# Patient Record
Sex: FEMALE | Race: WHITE | NOT HISPANIC OR LATINO | Employment: FULL TIME | URBAN - METROPOLITAN AREA
[De-identification: names, ages, dates, MRNs, and addresses within clinical notes are randomized per-mention and may not be internally consistent; named-entity substitution may affect disease eponyms.]

---

## 2018-01-17 NOTE — RESULT NOTES
Verified Results  * MRI KNEE LEFT  WO CONTRAST [id number] 05:59AM Suze Luu Order Number: [id number]   Performing Comments: 42-year-old female status post left lower extremity injury involving the hip knee and ankle with worsening left medial knee pain  Please evaluate for medial femoral condyle and medial meniscal injury  - Patient Instructions: To    schedule this appointment, please contact Central Scheduling at [phone]  Test Name Result Flag Reference   MRI KNEE LEFT  WO CONTRAST (Report)     This is a summary report  The complete report is available in the patient's medical record  If you cannot access the medical record, please contact the sending organization for a detailed fax or copy  MRI LEFT KNEE     INDICATION: Medial left knee pain and swelling status post fall on 4/22/2016  COMPARISON: Left knee plain films from 4/27/2016     TECHNIQUE:  MR sequences were obtained of the left knee including: Localizer, axial T2 fat sat, coronal T1/T2 fat sat, sagittal PD/T2 fat sat  Images were acquired on a 1 5 Crystal unit  Gadolinium was not used  FINDINGS:     SUBCUTANEOUS TISSUES: There is prepatellar subcutaneous edema without bursitis formation  JOINT EFFUSION: None  BAKER'S CYST: None  MENISCI: Intact  CRUCIATE LIGAMENTS: Intact  EXTENSOR APPARATUS: There is mild tendinosis of the patellar tendon, without tear  (Series 6 image 12 ) Quadriceps tendon is normal      COLLATERAL LIGAMENTS: There is a subacute, partial tear (grade 2 sprain) of the medial collateral ligament (series 5 images 11 through 13, and series 3 images 9 through 17 ) The lateral collateral ligament complex is normal      ARTICULAR SURFACES: There is mild tricompartment osteoarthritis  BONE MARROW: Normal signal without fracture  MUSCULATURE: Intact         IMPRESSION:     There is a subacute, partial tear (grade 2 sprain) of the medial collateral ligament (series 5 images 11 through 13, and series 3 images 9 through 17 )     Mild tricompartmental osteoarthritis  There is mild tendinosis of the patellar tendon, without tear  (Series 6 image 12 )     There is no evidence of meniscal tear         Workstation performed: [id number]     Signed by:   Sonia Hanna MD   6/29/16

## 2024-03-21 ENCOUNTER — OFFICE VISIT (OUTPATIENT)
Dept: SURGERY | Facility: CLINIC | Age: 69
End: 2024-03-21
Payer: COMMERCIAL

## 2024-03-21 VITALS
DIASTOLIC BLOOD PRESSURE: 80 MMHG | HEIGHT: 66 IN | HEART RATE: 83 BPM | TEMPERATURE: 99.2 F | BODY MASS INDEX: 47.09 KG/M2 | WEIGHT: 293 LBS | SYSTOLIC BLOOD PRESSURE: 140 MMHG | OXYGEN SATURATION: 93 %

## 2024-03-21 DIAGNOSIS — R22.32 AXILLARY MASS, LEFT: Primary | ICD-10-CM

## 2024-03-21 PROBLEM — J45.909 BRONCHIAL ASTHMA: Status: ACTIVE | Noted: 2024-03-21

## 2024-03-21 PROBLEM — I10 ESSENTIAL HYPERTENSION: Status: ACTIVE | Noted: 2024-03-21

## 2024-03-21 PROBLEM — K80.20 ASYMPTOMATIC CHOLELITHIASIS: Status: ACTIVE | Noted: 2024-03-21

## 2024-03-21 PROBLEM — I35.0 AORTIC STENOSIS: Status: ACTIVE | Noted: 2024-03-21

## 2024-03-21 PROCEDURE — 99203 OFFICE O/P NEW LOW 30 MIN: CPT | Performed by: SURGERY

## 2024-03-21 RX ORDER — ALBUTEROL SULFATE 90 UG/1
AEROSOL, METERED RESPIRATORY (INHALATION)
COMMUNITY
Start: 2024-01-18

## 2024-03-21 RX ORDER — FUROSEMIDE 20 MG/1
20 TABLET ORAL DAILY PRN
COMMUNITY
Start: 2024-01-04 | End: 2025-01-03

## 2024-03-21 RX ORDER — ALBUTEROL SULFATE 2.5 MG/3ML
SOLUTION RESPIRATORY (INHALATION)
COMMUNITY

## 2024-03-21 NOTE — PROGRESS NOTES
"Assessment/Plan:     Diagnoses and all orders for this visit:    Axillary mass, left    Other orders  -     furosemide (LASIX) 20 mg tablet; Take 20 mg by mouth daily as needed  -     albuterol (2.5 mg/3 mL) 0.083 % nebulizer solution; TAKE 3 ML (2.5 MG TOTAL) BY NEBULIZATION 3 TIME DAY  -     albuterol (PROVENTIL HFA,VENTOLIN HFA) 90 mcg/act inhaler     Patient is being scheduled for excision of left axillary mass under general anesthesia at [facility] in June.    Procedure discussed with patient and informed consent obtained    Risks include bleeding, infection, numbness in the axilla    Preadmission testing: CBC, BMP, EKG    Subjective:      Patient ID: Josephine Thomson is a 68 y.o. female.  Retired     HPI  Patient is seen in the office with several years history of a painless mass in the left axilla.  This has been investigated by her previous surgeon.  This was presumed to be a benign mass.  Patient was preparing to have this excised before the COVID pandemic.  The surgery was hence delayed.  This is a painless mass with history of drainage from the skin.    The following portions of the patient's history were reviewed and updated as appropriate: allergies, current medications, past family history, past medical history, past social history, past surgical history, and problem list.    Review of Systems    History of bilateral breast biopsies of a benign lumps  History of left oophorectomy in the remote past    Mild to moderate aortic stenosis for which patient is asymptomatic.  She denies any chest pain or palpitations.  She has been under the care of a cardiologist.    Essential hypertension    Bronchial asthma    Left ankle replacement 2019    Objective:    /80 (BP Location: Right arm, Patient Position: Sitting, Cuff Size: Standard)   Pulse 83   Temp 99.2 °F (37.3 °C) (Tympanic)   Ht 5' 5.5\" (1.664 m)   Wt 133 kg (293 lb)   LMP  (LMP Unknown)   SpO2 93%   BMI 48.02 kg/m² "      Physical Exam    No pallor or icterus  No cervical lymphadenopathy  In the left axilla on the medial wall is a palpable mass measuring 5 cm in size.  This is a subcutaneous mass which feels like a lipoma.  On the summit of the mass is a reddish inflamed area which may be a punctum related to a small cyst although the cyst is not discretely palpable.    Heart sounds are normal with a grade 2/6 ejection systolic murmur  Chest clear to auscultation with no wheezing    Abdominal exam reveals no masses    Extremity exam reveals a scar on the left ankle where patient had a ankle replacement

## 2024-05-23 ENCOUNTER — APPOINTMENT (OUTPATIENT)
Dept: LAB | Facility: CLINIC | Age: 69
End: 2024-05-23
Payer: COMMERCIAL

## 2024-05-23 DIAGNOSIS — R22.32 AXILLARY MASS, LEFT: ICD-10-CM

## 2024-05-23 LAB
ANION GAP SERPL CALCULATED.3IONS-SCNC: 9 MMOL/L (ref 4–13)
BUN SERPL-MCNC: 20 MG/DL (ref 5–25)
CALCIUM SERPL-MCNC: 9.7 MG/DL (ref 8.4–10.2)
CHLORIDE SERPL-SCNC: 98 MMOL/L (ref 96–108)
CO2 SERPL-SCNC: 29 MMOL/L (ref 21–32)
CREAT SERPL-MCNC: 0.73 MG/DL (ref 0.6–1.3)
ERYTHROCYTE [DISTWIDTH] IN BLOOD BY AUTOMATED COUNT: 13.4 % (ref 11.6–15.1)
GFR SERPL CREATININE-BSD FRML MDRD: 85 ML/MIN/1.73SQ M
GLUCOSE P FAST SERPL-MCNC: 109 MG/DL (ref 65–99)
HCT VFR BLD AUTO: 41.9 % (ref 34.8–46.1)
HGB BLD-MCNC: 14.1 G/DL (ref 11.5–15.4)
MCH RBC QN AUTO: 29.6 PG (ref 26.8–34.3)
MCHC RBC AUTO-ENTMCNC: 33.7 G/DL (ref 31.4–37.4)
MCV RBC AUTO: 88 FL (ref 82–98)
PLATELET # BLD AUTO: 207 THOUSANDS/UL (ref 149–390)
PMV BLD AUTO: 11.6 FL (ref 8.9–12.7)
POTASSIUM SERPL-SCNC: 4.2 MMOL/L (ref 3.5–5.3)
RBC # BLD AUTO: 4.76 MILLION/UL (ref 3.81–5.12)
SODIUM SERPL-SCNC: 136 MMOL/L (ref 135–147)
WBC # BLD AUTO: 6.63 THOUSAND/UL (ref 4.31–10.16)

## 2024-05-23 PROCEDURE — 80048 BASIC METABOLIC PNL TOTAL CA: CPT

## 2024-05-23 PROCEDURE — 85027 COMPLETE CBC AUTOMATED: CPT

## 2024-05-23 PROCEDURE — 36415 COLL VENOUS BLD VENIPUNCTURE: CPT

## 2024-05-30 RX ORDER — UREA 10 %
1 LOTION (ML) TOPICAL
COMMUNITY

## 2024-05-30 RX ORDER — VALSARTAN AND HYDROCHLOROTHIAZIDE 160; 25 MG/1; MG/1
1 TABLET ORAL
COMMUNITY
Start: 2024-05-06

## 2024-05-30 NOTE — PRE-PROCEDURE INSTRUCTIONS
Pre-Surgery Instructions:   Medication Instructions    albuterol (2.5 mg/3 mL) 0.083 % nebulizer solution Uses PRN- OK to take day of surgery    albuterol (PROVENTIL HFA,VENTOLIN HFA) 90 mcg/act inhaler Uses PRN- OK to take day of surgery    furosemide (LASIX) 20 mg tablet Uses PRN- DO NOT take day of surgery    melatonin 1 mg Stop taking 7 days prior to surgery.    NON FORMULARY Stop taking 7 days prior to surgery.    NON FORMULARY Stop taking 7 days prior to surgery.    valsartan-hydrochlorothiazide (DIOVAN-HCT) 160-25 MG per tablet Take night before surgery   Medication instructions for day surgery reviewed. Please use only a sip of water to take your instructed medications. Avoid all over the counter vitamins, supplements and NSAIDS for one week prior to surgery per anesthesia guidelines. Tylenol is ok to take as needed.     You will receive a call one business day prior to surgery with an arrival time and hospital directions. If your surgery is scheduled on a Monday, the hospital will be calling you on the Friday prior to your surgery. If you have not heard from anyone by 8pm, please call the hospital supervisor through the hospital  at [phone]. ([place] [phone] or [place] [phone]).    Do not eat or drink anything after midnight the night before your surgery, including candy, mints, lifesavers, or chewing gum. Do not drink alcohol 24hrs before your surgery. Try not to smoke at least 24hrs before your surgery.       Follow the pre surgery showering instructions as listed in the “My Surgical Experience Booklet” or otherwise provided by your surgeon's office. Do not use a blade to shave the surgical area 1 week before surgery. It is okay to use a clean electric clippers up to 24 hours before surgery. Do not apply any lotions, creams, including makeup, cologne, deodorant, or perfumes after showering on the day of your surgery. Do not use dry shampoo, hair spray, hair gel, or any type of  hair products.     No contact lenses, eye make-up, or artificial eyelashes. Remove nail polish, including gel polish, and any artificial, gel, or acrylic nails if possible. Remove all jewelry including rings and body piercing jewelry.     Wear causal clothing that is easy to take on and off. Consider your type of surgery.    Keep any valuables, jewelry, piercings at home. Please bring any specially ordered equipment (sling, braces) if indicated.    Arrange for a responsible person to drive you to and from the hospital on the day of your surgery. Please confirm the visitor policy for the day of your procedure when you receive your phone call with an arrival time.     Call the surgeon's office with any new illnesses, exposures, or additional questions prior to surgery.    Please reference your “My Surgical Experience Booklet” for additional information to prepare for your upcoming surgery.

## 2024-06-03 ENCOUNTER — ANESTHESIA EVENT (OUTPATIENT)
Dept: PERIOP | Facility: HOSPITAL | Age: 69
End: 2024-06-03
Payer: COMMERCIAL

## 2024-06-07 ENCOUNTER — HOSPITAL ENCOUNTER (OUTPATIENT)
Facility: HOSPITAL | Age: 69
Setting detail: OUTPATIENT SURGERY
Discharge: HOME | End: 2024-06-07
Attending: SURGERY | Admitting: SURGERY
Payer: COMMERCIAL

## 2024-06-07 ENCOUNTER — ANESTHESIA (OUTPATIENT)
Dept: PERIOP | Facility: HOSPITAL | Age: 69
End: 2024-06-07
Payer: COMMERCIAL

## 2024-06-07 VITALS
TEMPERATURE: 97.9 F | HEART RATE: 56 BPM | HEIGHT: 65 IN | OXYGEN SATURATION: 94 % | SYSTOLIC BLOOD PRESSURE: 150 MMHG | WEIGHT: 285 LBS | BODY MASS INDEX: 47.48 KG/M2 | DIASTOLIC BLOOD PRESSURE: 65 MMHG | RESPIRATION RATE: 18 BRPM

## 2024-06-07 DIAGNOSIS — R22.32 AXILLARY MASS, LEFT: Primary | ICD-10-CM

## 2024-06-07 PROCEDURE — 11406 EXC TR-EXT B9+MARG >4.0 CM: CPT | Performed by: SURGERY

## 2024-06-07 PROCEDURE — 88304 TISSUE EXAM BY PATHOLOGIST: CPT | Performed by: PATHOLOGY

## 2024-06-07 PROCEDURE — NC001 PR NO CHARGE: Performed by: SURGERY

## 2024-06-07 PROCEDURE — 11406 EXC TR-EXT B9+MARG >4.0 CM: CPT | Performed by: PHYSICIAN ASSISTANT

## 2024-06-07 RX ORDER — PROPOFOL 10 MG/ML
INJECTION, EMULSION INTRAVENOUS AS NEEDED
Status: DISCONTINUED | OUTPATIENT
Start: 2024-06-07 | End: 2024-06-07

## 2024-06-07 RX ORDER — ONDANSETRON 2 MG/ML
INJECTION INTRAMUSCULAR; INTRAVENOUS AS NEEDED
Status: DISCONTINUED | OUTPATIENT
Start: 2024-06-07 | End: 2024-06-07

## 2024-06-07 RX ORDER — ACETAMINOPHEN 325 MG/1
975 TABLET ORAL ONCE
Status: COMPLETED | OUTPATIENT
Start: 2024-06-07 | End: 2024-06-07

## 2024-06-07 RX ORDER — DEXAMETHASONE SODIUM PHOSPHATE 10 MG/ML
INJECTION, SOLUTION INTRAMUSCULAR; INTRAVENOUS AS NEEDED
Status: DISCONTINUED | OUTPATIENT
Start: 2024-06-07 | End: 2024-06-07

## 2024-06-07 RX ORDER — FENTANYL CITRATE 50 UG/ML
INJECTION, SOLUTION INTRAMUSCULAR; INTRAVENOUS AS NEEDED
Status: DISCONTINUED | OUTPATIENT
Start: 2024-06-07 | End: 2024-06-07

## 2024-06-07 RX ORDER — CEFAZOLIN SODIUM 1 G/50ML
1000 SOLUTION INTRAVENOUS ONCE
Status: DISCONTINUED | OUTPATIENT
Start: 2024-06-07 | End: 2024-06-07 | Stop reason: HOSPADM

## 2024-06-07 RX ORDER — CEFAZOLIN SODIUM 2 G/50ML
2000 SOLUTION INTRAVENOUS ONCE
Status: COMPLETED | OUTPATIENT
Start: 2024-06-07 | End: 2024-06-07

## 2024-06-07 RX ORDER — SODIUM CHLORIDE, SODIUM LACTATE, POTASSIUM CHLORIDE, CALCIUM CHLORIDE 600; 310; 30; 20 MG/100ML; MG/100ML; MG/100ML; MG/100ML
INJECTION, SOLUTION INTRAVENOUS CONTINUOUS PRN
Status: DISCONTINUED | OUTPATIENT
Start: 2024-06-07 | End: 2024-06-07

## 2024-06-07 RX ORDER — METOCLOPRAMIDE HYDROCHLORIDE 5 MG/ML
10 INJECTION INTRAMUSCULAR; INTRAVENOUS ONCE AS NEEDED
Status: DISCONTINUED | OUTPATIENT
Start: 2024-06-07 | End: 2024-06-07 | Stop reason: HOSPADM

## 2024-06-07 RX ORDER — LIDOCAINE HYDROCHLORIDE 10 MG/ML
INJECTION, SOLUTION EPIDURAL; INFILTRATION; INTRACAUDAL; PERINEURAL AS NEEDED
Status: DISCONTINUED | OUTPATIENT
Start: 2024-06-07 | End: 2024-06-07

## 2024-06-07 RX ORDER — OXYCODONE HYDROCHLORIDE AND ACETAMINOPHEN 5; 325 MG/1; MG/1
1 TABLET ORAL EVERY 4 HOURS PRN
Qty: 10 TABLET | Refills: 0 | Status: SHIPPED | OUTPATIENT
Start: 2024-06-07 | End: 2024-06-17

## 2024-06-07 RX ORDER — LIDOCAINE HYDROCHLORIDE AND EPINEPHRINE 10; 10 MG/ML; UG/ML
INJECTION, SOLUTION INFILTRATION; PERINEURAL AS NEEDED
Status: DISCONTINUED | OUTPATIENT
Start: 2024-06-07 | End: 2024-06-07 | Stop reason: HOSPADM

## 2024-06-07 RX ORDER — FENTANYL CITRATE/PF 50 MCG/ML
50 SYRINGE (ML) INJECTION
Status: DISCONTINUED | OUTPATIENT
Start: 2024-06-07 | End: 2024-06-07 | Stop reason: HOSPADM

## 2024-06-07 RX ORDER — OXYCODONE HYDROCHLORIDE AND ACETAMINOPHEN 5; 325 MG/1; MG/1
1 TABLET ORAL EVERY 4 HOURS PRN
Status: DISCONTINUED | OUTPATIENT
Start: 2024-06-07 | End: 2024-06-07 | Stop reason: HOSPADM

## 2024-06-07 RX ORDER — BUPIVACAINE HYDROCHLORIDE AND EPINEPHRINE 2.5; 5 MG/ML; UG/ML
INJECTION, SOLUTION EPIDURAL; INFILTRATION; INTRACAUDAL; PERINEURAL AS NEEDED
Status: DISCONTINUED | OUTPATIENT
Start: 2024-06-07 | End: 2024-06-07 | Stop reason: HOSPADM

## 2024-06-07 RX ORDER — MAGNESIUM HYDROXIDE 1200 MG/15ML
LIQUID ORAL AS NEEDED
Status: DISCONTINUED | OUTPATIENT
Start: 2024-06-07 | End: 2024-06-07 | Stop reason: HOSPADM

## 2024-06-07 RX ORDER — MIDAZOLAM HYDROCHLORIDE 2 MG/2ML
INJECTION, SOLUTION INTRAMUSCULAR; INTRAVENOUS AS NEEDED
Status: DISCONTINUED | OUTPATIENT
Start: 2024-06-07 | End: 2024-06-07

## 2024-06-07 RX ORDER — HYDROMORPHONE HCL/PF 1 MG/ML
0.5 SYRINGE (ML) INJECTION
Status: DISCONTINUED | OUTPATIENT
Start: 2024-06-07 | End: 2024-06-07 | Stop reason: HOSPADM

## 2024-06-07 RX ORDER — EPHEDRINE SULFATE 50 MG/ML
INJECTION INTRAVENOUS AS NEEDED
Status: DISCONTINUED | OUTPATIENT
Start: 2024-06-07 | End: 2024-06-07

## 2024-06-07 RX ORDER — ONDANSETRON 2 MG/ML
4 INJECTION INTRAMUSCULAR; INTRAVENOUS ONCE AS NEEDED
Status: DISCONTINUED | OUTPATIENT
Start: 2024-06-07 | End: 2024-06-07 | Stop reason: HOSPADM

## 2024-06-07 RX ADMIN — SODIUM CHLORIDE, SODIUM LACTATE, POTASSIUM CHLORIDE, CALCIUM CHLORIDE: 600; 310; 30; 20 INJECTION, SOLUTION INTRAVENOUS at 08:15

## 2024-06-07 RX ADMIN — MIDAZOLAM HYDROCHLORIDE 2 MG: 2 INJECTION, SOLUTION INTRAMUSCULAR; INTRAVENOUS at 09:35

## 2024-06-07 RX ADMIN — EPHEDRINE SULFATE 10 MG: 50 INJECTION INTRAVENOUS at 10:02

## 2024-06-07 RX ADMIN — LIDOCAINE HYDROCHLORIDE 50 MG: 10 INJECTION, SOLUTION EPIDURAL; INFILTRATION; INTRACAUDAL; PERINEURAL at 09:40

## 2024-06-07 RX ADMIN — DEXAMETHASONE SODIUM PHOSPHATE 10 MG: 10 INJECTION, SOLUTION INTRAMUSCULAR; INTRAVENOUS at 09:40

## 2024-06-07 RX ADMIN — ACETAMINOPHEN 975 MG: 325 TABLET, FILM COATED ORAL at 08:23

## 2024-06-07 RX ADMIN — ONDANSETRON 4 MG: 2 INJECTION INTRAMUSCULAR; INTRAVENOUS at 09:40

## 2024-06-07 RX ADMIN — FENTANYL CITRATE 25 MCG: 50 INJECTION, SOLUTION INTRAMUSCULAR; INTRAVENOUS at 09:50

## 2024-06-07 RX ADMIN — PROPOFOL 200 MG: 10 INJECTION, EMULSION INTRAVENOUS at 09:40

## 2024-06-07 RX ADMIN — CEFAZOLIN SODIUM 3000 MG: 2 SOLUTION INTRAVENOUS at 09:35

## 2024-06-07 NOTE — ANESTHESIA PREPROCEDURE EVALUATION
Procedure:  EXCISION LEFT AXILLARY MASS (Left: Axilla)    Relevant Problems   ANESTHESIA (within normal limits)      CARDIO   (+) Aortic stenosis   (+) Essential hypertension      PULMONARY   (+) Bronchial asthma   (+) Sleep apnea      Surgery/Wound/Pain   (+) Anesthesia complication        TTE 8/16/2023    Left Ventricle: Left ventricle is normal in size. Systolic function is   normal with an ejection fraction of 60-65%. Wall motion is within normal   limits.    Left Atrium: Left atrium cavity is mildly dilated.     Right Ventricle: Right ventricle cavity is moderately dilated. Systolic   function is mildly reduced.     IVC/SVC: The inferior vena cava demonstrates a diameter of <=21 mm and   collapses >50%; therefore, the right atrial pressure is estimated at 0-5   mmHg.       Physical Exam    Airway    Mallampati score: III  TM Distance: >3 FB  Neck ROM: full     Dental   No notable dental hx     Cardiovascular      Pulmonary      Other Findings  post-pubertal.      Anesthesia Plan  ASA Score- 2     Anesthesia Type- general with ASA Monitors.         Additional Monitors:     Airway Plan: LMA.           Plan Factors-Exercise tolerance (METS): >4 METS.    Chart reviewed. EKG reviewed.  Existing labs reviewed. Patient summary reviewed.    Patient is not a current smoker.              Induction- intravenous.    Postoperative Plan- Plan for postoperative opioid use.         Informed Consent- Anesthetic plan and risks discussed with patient.  I personally reviewed this patient with the CRNA. Discussed and agreed on the Anesthesia Plan with the CRNA..

## 2024-06-07 NOTE — OP NOTE
OPERATIVE REPORT  PATIENT NAME: Josephine Thomson    :  1955  MRN: [id number]  Pt Location: [facility] OR ROOM 02    SURGERY DATE: 2024    Surgeons and Role:     * Indio Campuzano MD - Primary     * Rachel Ziegler PA-C - Assisting    Preop Diagnosis:  Axillary mass, left [R22.32]    Post-Op Diagnosis Codes:     * Axillary mass, left [R22.32]  Size 10.5 x 10 cm    Procedure(s):  Left - EXCISION LEFT AXILLARY MASS    Specimen(s):  ID Type Source Tests Collected by Time Destination   1 : Mass Left Axilla Tissue Mass TISSUE EXAM Indio Campuzano MD 2024 1011        Estimated Blood Loss:   Minimal    Drains:  * No LDAs found *    Anesthesia Type:   General anesthesia via laryngeal airway.  Local anesthesia used is 20 mL of 1% lidocaine with epinephrine and 20 mL of 0.25% Marcaine with 1 in 200,000 epinephrine    Operative Indications:  Axillary mass, left [R22.32]  This is a 68-year-old female who was seen in the office with a longstanding mass in the left axilla.  This is a painless mass which has been present for more than 5 years.  Clinically this felt either a lipoma or a accessory breast.    Operative Findings:  Patient had a subcutaneous mass which measured 10.5 x 10.0 cm.  To the naked eye this looks like a lipoma.      Complications:   None    Procedure and Technique:  Patient was brought to the operating room suite.  She was identified by me.  She was laid supine on the operating table.  The left arm was placed on a side table.  General anesthesia was given via laryngeal airway.  Patient was given preoperative dose of Cefazolin.    The left axilla and the left arm were cleaned with ChloraPrep and patient was draped.  Local infiltration anesthesia was given.  A transverse incision was made on the medial wall of the left axilla in the skin crease, dividing the skin and subcutaneous tissues.  The mass was very superficial and surprisingly had no attachments.  The mass was easily  enucleated.  Hemostasis was obtained.  Marcaine was injected for postop pain relief.  Skin was closed subcuticular 4-0 Monocryl.  Surgical glue was applied.    Patient tolerated procedure well.  I was present for the entire procedure., A qualified resident physician was not available. A physician assistant was required during the procedure for retraction, tissue handling, dissection and suturing.    Patient Disposition:  PACU         SIGNATURE:  Indio Campuzano MD  DATE: June 7, 2024  TIME: 10:12 AM

## 2024-06-07 NOTE — ANESTHESIA POSTPROCEDURE EVALUATION
Post-Op Assessment Note    CV Status:  Stable  Pain Score: 0    Pain management: adequate       Mental Status:  Alert and awake   Hydration Status:  Stable   PONV Controlled:  None   Airway Patency:  Patent  Two or more mitigation strategies used for obstructive sleep apnea   Post Op Vitals Reviewed: Yes    No anethesia notable event occurred.    Staff: CRNA               BP   129/54    Temp   97.7   Pulse  71   Resp   16   SpO2   98

## 2024-06-07 NOTE — H&P
H&P Exam - General Surgery   Josephine Thomson 68 y.o. female MRN: [id number]  Unit/Bed#: OR [place] Encounter: [id number]      History of Present Illness     HPI:  Josephine Thomson is a 68 y.o. female who presents with left axillary mass.  Patient was seen in the office in March with a longstanding mass in the left axilla which has been present for at least 5 years.  She was supposed to have this removed but then due to COVID pandemic the surgery was delayed.  This is a painless mass which has been increasing in size.    Review of Systems  Left ankle replacement  Mild to moderate aortic stenosis    Historical Information   Past Medical History:   Diagnosis Date    Anesthesia complication     Difficult to arouse; Has post-op itching    Arthritis     Asthma     CPAP (continuous positive airway pressure) dependence     HTN (hypertension)     Hypertension     Sleep apnea      Past Surgical History:   Procedure Laterality Date    COLONOSCOPY      PARTIAL HYSTERECTOMY      TOTAL ANKLE REPLACEMENT Left      Social History   Social History     Substance and Sexual Activity   Alcohol Use Yes    Alcohol/week: 2.0 standard drinks of alcohol    Types: 2 Standard drinks or equivalent per week     Social History     Substance and Sexual Activity   Drug Use No     Social History     Tobacco Use   Smoking Status Former    Current packs/day: 0.00    Average packs/day: 0.5 packs/day for 16.0 years (8.0 ttl pk-yrs)    Types: Cigarettes    Start date:     Quit date:     Years since quittin.4   Smokeless Tobacco Not on file     Family History: non-contributory    Meds/Allergies   all medications and allergies reviewed  Allergies   Allergen Reactions    Erythromycin Hives and Swelling    Nisoldipine Other (See Comments)     Pt does not know why it is coming from  however would like to keep on    Sulfa Antibiotics Hives and Swelling    Tetracyclines & Related Hives and Swelling       Objective   First Vitals:   Blood Pressure: (!)  "181/83 (06/07/24 0816)  Pulse: 68 (06/07/24 0816)  Temperature: 97.6 °F (36.4 °C) (06/07/24 0816)  Temp Source: Temporal (06/07/24 0816)  Respirations: 18 (06/07/24 0816)  Height: 5' 5\" (165.1 cm) (06/07/24 0816)  Weight - Scale: 131 kg (288 lb) (05/30/24 1524)  SpO2: 95 % (06/07/24 0816)    Current Vitals:   Blood Pressure: (!) 181/83 (06/07/24 0816)  Pulse: 68 (06/07/24 0816)  Temperature: 97.6 °F (36.4 °C) (06/07/24 0816)  Temp Source: Temporal (06/07/24 0816)  Respirations: 18 (06/07/24 0816)  Height: 5' 5\" (165.1 cm) (06/07/24 0816)  Weight - Scale: 129 kg (285 lb) (06/07/24 0816)  SpO2: 95 % (06/07/24 0816)    No intake or output data in the 24 hours ending 06/07/24 0919    Invasive Devices       Peripheral Intravenous Line  Duration             Peripheral IV 06/07/24 Distal;Dorsal (posterior);Right Forearm <1 day                    Physical Exam  No pallor or icterus    Heart sounds are normal with grade 2/6 aortic ejection systolic murmur  Chest clear to auscultation  Abdominal exam reveals no tenderness or masses    Patient has a left axillary mass on the medial wall which measures 8 cm in size          Lab Results: I have personally reviewed pertinent lab results.    Imaging: I have personally reviewed pertinent reports.  ..........No results found.  EKG, Pathology, and Other Studies: I have personally reviewed pertinent reports.    Assessment & Plan     Assessment:  Left axillary mass    Plan:  Excision of left axillary mass    Risks include bleeding and infection      Code Status: No Order  Advance Directive and Living Will:      Power of :    POLST:      Counseling / Coordination of Care  Total floor / unit time spent today 20 minutes.  Greater than 50% of total time was spent with the patient and / or family counseling and / or coordination of care.  A description of the counseling / coordination of care: Preoperative evaluation.     "

## 2024-06-07 NOTE — DISCHARGE INSTR - AVS FIRST PAGE
Post-Operative Care Instructions        1. General: You will feel pulling sensations around the wound or funny aches and pains around the incisions. This is normal. Even minor surgery is a change in your body and this is your body’s way of reaction to it. If you have had abdominal surgery, it may help to support the incision with a small pillow or blanket for comfort when moving or coughing.    2. Wound care:    Glue - Leave glue alone, it will fall off on its own, no need for an additional dressings    3. Water: You may shower over the wound, unless there are drain tubes left in place. Do not bathe or use a pool or hot tub until cleared by the physician. You may shower right over the staples, glue or Steri-Strips and rinse wound with soapy water but do not scrub incision pat dry when you are done.    4. Activity: You may go up and down stairs, walk as much as you are comfortable, but walk at least 3 times each day. If you have had abdominal surgery, do not lift anything heavier than 15 pounds for at least 2 weeks, unless cleared by the doctor.    5. Diet: You may resume a regular diet. If you had a same-day surgery or overnight stay surgery, you may wish to eat lightly for a few days: soups, crackers, and sandwiches. You may resume a regular diet when ready.    6. Medications: Resume all of your previous medications, unless told otherwise by the doctor. Avoid aspirin or ibuprofen (Advil, Motrin, etc.) products for 2-3 days after the date of surgery. You may, at that time, began to take them again. Tylenol is always fine, unless you are taking any narcotic pain medication containing Tylenol (such as Percocet, Darvocet, Vicodin, or anything containing acetaminophen). Do not take Tylenol if you're taking these medications. You do not need to take the narcotic pain medications unless you are having significant pain and discomfort.    7. Driving: He will need someone to drive you home on the day of surgery. Do not drive  or make any important decisions while on narcotic pain medication or 24 hours and after anesthesia or sedation for surgery. Generally, you may drive when your off all narcotic pain medications.    8. Upset Stomach: You may take Maalox, Tums, or similar items for an upset stomach. If your narcotic pain medication causes an upset stomach, do not take it on an empty stomach. Try taking it with at least some crackers or toast.     9. Constipation: Patients often experienced constipation after surgery. You may take over-the-counter medication for this, such as Metamucil, Senokot, Dulcolax, milk of magnesia, etc. You may take a suppository unless you have had anorectal surgery such as a procedure on your hemorrhoids. If you experience significant nausea or vomiting after abdominal surgery, call the office before trying any of these medications.    10. Call the office: If you are experiencing any of the following, fevers above 101.5°, significant nausea or vomiting, if the wound develops drainage and/or is excessive redness around the wound, or if you have significant diarrhea or other worsening symptoms.    11. Pain: You may be given a prescription for pain. This will be given to the hospital, the day of surgery.

## 2024-06-11 PROCEDURE — 88304 TISSUE EXAM BY PATHOLOGIST: CPT | Performed by: PATHOLOGY

## 2024-06-20 ENCOUNTER — OFFICE VISIT (OUTPATIENT)
Dept: SURGERY | Facility: CLINIC | Age: 69
End: 2024-06-20

## 2024-06-20 VITALS
WEIGHT: 286 LBS | BODY MASS INDEX: 47.65 KG/M2 | OXYGEN SATURATION: 95 % | DIASTOLIC BLOOD PRESSURE: 68 MMHG | HEART RATE: 61 BPM | HEIGHT: 65 IN | TEMPERATURE: 98.6 F | SYSTOLIC BLOOD PRESSURE: 114 MMHG

## 2024-06-20 DIAGNOSIS — D17.22 LIPOMA OF LEFT AXILLA: Primary | ICD-10-CM

## 2024-06-20 PROCEDURE — 99024 POSTOP FOLLOW-UP VISIT: CPT | Performed by: SURGERY

## 2024-06-20 RX ORDER — SEMAGLUTIDE 0.25 MG/.5ML
0.25 INJECTION, SOLUTION SUBCUTANEOUS
COMMUNITY
Start: 2024-05-29

## 2024-06-20 NOTE — PROGRESS NOTES
"Assessment/Plan:     Diagnoses and all orders for this visit:    Lipoma of left axilla    Other orders  -     Wegovy 0.25 MG/0.5ML; Inject 0.25 mg under the skin     Satisfactory postoperative progress.  Pathology report checked.    Discharge and see as needed.    Subjective:      Patient ID: Josephine Thomson is a 68 y.o. female.    HPI  Patient is 2 weeks status post excision of a left axillary mass.  Pathology report shows this to be a lipoma.    Patient has no complaints.    The following portions of the patient's history were reviewed and updated as appropriate: allergies, current medications, past family history, past medical history, past social history, past surgical history, and problem list.    Review of Systems    No fever    Objective:    /68 (BP Location: Left arm, Patient Position: Sitting, Cuff Size: Standard)   Pulse 61   Temp 98.6 °F (37 °C) (Tympanic)   Ht 5' 5\" (1.651 m)   Wt 130 kg (286 lb)   LMP  (LMP Unknown)   SpO2 95%   BMI 47.59 kg/m²      Physical Exam    Left axillary incision is clean and healed  "

## (undated) DEVICE — EXOFIN PRECISION PEN HIGH VISCOSITY TOPICAL SKIN ADHESIVE: Brand: EXOFIN PRECISION PEN, 1G

## (undated) DEVICE — STOCKINETTE 2P PREROLLD 6X60

## (undated) DEVICE — NEEDLE 25G X 1 1/2

## (undated) DEVICE — ADHESIVE SKIN HIGH VISCOSITY EXOFIN 1ML

## (undated) DEVICE — SUT MONOCRYL 4-0 P-3 18 IN Y494G

## (undated) DEVICE — NEEDLE BLUNT 18 G X 1 1/2IN

## (undated) DEVICE — CHLORAPREP HI-LITE 26ML ORANGE

## (undated) DEVICE — BETHLEHEM UNIVERSAL  MIONR EXT: Brand: CARDINAL HEALTH

## (undated) DEVICE — SYRINGE 30ML LL

## (undated) DEVICE — INTENDED FOR TISSUE SEPARATION, AND OTHER PROCEDURES THAT REQUIRE A SHARP SURGICAL BLADE TO PUNCTURE OR CUT.: Brand: BARD-PARKER ® SAFETYLOCK CARBON RIB-BACK BLADES

## (undated) DEVICE — SCD SEQUENTIAL COMPRESSION COMFORT SLEEVE MEDIUM KNEE LENGTH: Brand: KENDALL SCD

## (undated) DEVICE — PENCIL ELECTROSURG E-Z CLEAN -0035H

## (undated) DEVICE — GLOVE SRG LF STRL BGL SKNSNS 6.5 PF